# Patient Record
Sex: FEMALE | Race: WHITE | NOT HISPANIC OR LATINO
[De-identification: names, ages, dates, MRNs, and addresses within clinical notes are randomized per-mention and may not be internally consistent; named-entity substitution may affect disease eponyms.]

---

## 2019-09-17 ENCOUNTER — TRANSCRIPTION ENCOUNTER (OUTPATIENT)
Age: 64
End: 2019-09-17

## 2022-07-13 ENCOUNTER — APPOINTMENT (OUTPATIENT)
Dept: ORTHOPEDIC SURGERY | Facility: CLINIC | Age: 67
End: 2022-07-13

## 2022-07-19 PROBLEM — Z00.00 ENCOUNTER FOR PREVENTIVE HEALTH EXAMINATION: Status: ACTIVE | Noted: 2022-07-19

## 2022-07-20 ENCOUNTER — APPOINTMENT (OUTPATIENT)
Dept: ORTHOPEDIC SURGERY | Facility: AMBULATORY SURGERY CENTER | Age: 67
End: 2022-07-20

## 2022-07-27 ENCOUNTER — APPOINTMENT (OUTPATIENT)
Dept: ORTHOPEDIC SURGERY | Facility: CLINIC | Age: 67
End: 2022-07-27

## 2022-07-27 VITALS — BODY MASS INDEX: 23.95 KG/M2 | HEIGHT: 66 IN | WEIGHT: 149 LBS

## 2022-07-27 DIAGNOSIS — M92.70 JUVENILE OSTEOCHONDROSIS OF METATARSUS, UNSPECIFIED FOOT: ICD-10-CM

## 2022-07-27 DIAGNOSIS — M79.674 PAIN IN RIGHT TOE(S): ICD-10-CM

## 2022-07-27 DIAGNOSIS — M81.0 AGE-RELATED OSTEOPOROSIS W/OUT CURRENT PATHOLOGICAL FRACTURE: ICD-10-CM

## 2022-07-27 DIAGNOSIS — Z78.9 OTHER SPECIFIED HEALTH STATUS: ICD-10-CM

## 2022-07-27 PROCEDURE — 99204 OFFICE O/P NEW MOD 45 MIN: CPT | Mod: 57

## 2022-07-27 PROCEDURE — 73630 X-RAY EXAM OF FOOT: CPT | Mod: LT

## 2022-07-27 NOTE — IMAGING
[Bilateral] : foot bilaterally [Weight -] : weightbearing [Degenerative change] : Degenerative change [de-identified] : L haLLUX RIGIDUS, r HEAKLED PROX PHALANX FRACTURE, 2 MTP DJD

## 2022-07-27 NOTE — HISTORY OF PRESENT ILLNESS
[0] : 0 [Burning] : burning [Localized] : localized [Shooting] : shooting [Stabbing] : stabbing [Tightness] : tightness [Tingling] : tingling [Frequent] : frequent [Nothing helps with pain getting better] : Nothing helps with pain getting better [Retired] : Work status: retired [de-identified] : Dong foot pain and deformity.  L "bunion"  R 2nd toe injured XMas.  Was in cam boot x 4 weeks [] : no [FreeTextEntry1] : bilat feet [FreeTextEntry5] : broken toe on rt foot on 12-25-21 and saw a podiatrist 12-27-21, in a boot and xr; cannot move toe and has bruising\par bunion, poss needs surgey on left foot [FreeTextEntry6] : in toe [de-identified] : worse as day goes on [de-identified] : Dec 2021 [de-identified] : podiatrist  [de-identified] : podiatrist

## 2022-07-27 NOTE — PHYSICAL EXAM
[Left] : left foot and ankle [1st] : 1st [Right] : right foot and ankle [2nd] : 2nd [2+] : dorsalis pedis pulse: 2+ [] : Sensation present to light touch in all distributions

## 2022-07-27 NOTE — DISCUSSION/SUMMARY
[de-identified] : Discussed surgical options:  L foot  cheil/phal osteotomy  R met osteotomy/mtp/pip/edl/edb\par The risks, benefits, alternatives have been discussed.  The risks include but are not limited to infection, bleeding, injury to small nerves and blood vessels, pain, stiffness, progression, dvt, PE, amputation and death.\par \par

## 2022-08-11 ENCOUNTER — LABORATORY RESULT (OUTPATIENT)
Age: 67
End: 2022-08-11

## 2022-08-11 RX ORDER — OXYCODONE 5 MG/1
5 TABLET ORAL
Qty: 42 | Refills: 0 | Status: ACTIVE | COMMUNITY
Start: 2022-08-11 | End: 1900-01-01

## 2022-08-15 ENCOUNTER — APPOINTMENT (OUTPATIENT)
Dept: ORTHOPEDIC SURGERY | Facility: AMBULATORY SURGERY CENTER | Age: 67
End: 2022-08-15

## 2022-08-24 ENCOUNTER — APPOINTMENT (OUTPATIENT)
Dept: ORTHOPEDIC SURGERY | Facility: CLINIC | Age: 67
End: 2022-08-24

## 2022-08-24 VITALS — HEIGHT: 66 IN | BODY MASS INDEX: 23.95 KG/M2 | WEIGHT: 149 LBS

## 2022-08-24 DIAGNOSIS — Z78.9 OTHER SPECIFIED HEALTH STATUS: ICD-10-CM

## 2022-08-24 DIAGNOSIS — M20.12 HALLUX VALGUS (ACQUIRED), LEFT FOOT: ICD-10-CM

## 2022-08-24 PROCEDURE — 99024 POSTOP FOLLOW-UP VISIT: CPT

## 2022-08-24 PROCEDURE — 73630 X-RAY EXAM OF FOOT: CPT | Mod: LT

## 2022-08-24 NOTE — IMAGING
[Left] : left foot [No loss of surgical correlation. Bony alignment acceptable. Hardware in appropriate position] : No loss of surgical correlation. Bony alignment acceptable. Hardware in appropriate position [de-identified] : s/p cheilectomy. pin intact

## 2022-08-24 NOTE — DISCUSSION/SUMMARY
[de-identified] : New dressing placed with post op shoe.\par heel and outside of foot WB \par f/u 2 weeks for pin and suture removal.\par  Anticipate switching into accomodative shoes and starting therapy at that time. \par \par

## 2022-08-24 NOTE — HISTORY OF PRESENT ILLNESS
[0] : 0 [Burning] : burning [Localized] : localized [Frequent] : frequent [Nothing helps with pain getting better] : Nothing helps with pain getting better [Retired] : Work status: retired [Tightness] : tightness [Meds] : meds [Shooting] : shooting [Stabbing] : stabbing [Tingling] : tingling [de-identified] : Dong foot pain and deformity.  L "bunion"  R 2nd toe injured XMas.  Was in cam boot x 4 weeks\par \par 8/15/22: Left foot cheilectomy and proximal phalanx osteotomy\par \par 8/24/22: f/u L foot. NWB in post op shoe. \par doing well.  [] : no [FreeTextEntry1] : bilat feet [FreeTextEntry5] : broken toe on rt foot on 12-25-21 and saw a podiatrist 12-27-21, in a boot and xr; cannot move toe and has bruising\par bunion, poss needs surgey on left foot [FreeTextEntry6] : in toe [FreeTextEntry9] : elevation [de-identified] : worse as day goes on [de-identified] : Dec 2021 [de-identified] : podiatrist  [de-identified] : 8-15-22 [de-identified] : podiatrist [de-identified] : 8-15-22

## 2022-09-07 ENCOUNTER — APPOINTMENT (OUTPATIENT)
Dept: ORTHOPEDIC SURGERY | Facility: CLINIC | Age: 67
End: 2022-09-07

## 2022-09-07 VITALS — BODY MASS INDEX: 23.95 KG/M2 | WEIGHT: 149 LBS | HEIGHT: 66 IN

## 2022-09-07 PROCEDURE — 99024 POSTOP FOLLOW-UP VISIT: CPT

## 2022-09-07 NOTE — PHYSICAL EXAM
[Left] : left foot and ankle [1st] : 1st [Right] : right foot and ankle [2nd] : 2nd [2+] : dorsalis pedis pulse: 2+ [] : ambulation with crutches [FreeTextEntry3] : Pin removed

## 2022-09-07 NOTE — HISTORY OF PRESENT ILLNESS
[Gradual] : gradual [Burning] : burning [Localized] : localized [Shooting] : shooting [Stabbing] : stabbing [Tightness] : tightness [Tingling] : tingling [Frequent] : frequent [Retired] : Work status: retired [5] : 5 [de-identified] : Dong foot pain and deformity.  L "bunion"  R 2nd toe injured XMas.  Was in cam boot x 4 weeks\par \par 8/15/22: Left foot cheilectomy and proximal phalanx osteotomy\par \par 8/24/22: f/u L foot. NWB in post op shoe. \par doing well. \par 9/7/22  f/u L foot  p-op shoe [] : no [FreeTextEntry1] : bilat feet [FreeTextEntry5] : broken toe on rt foot on 12-25-21 and saw a podiatrist 12-27-21, in a boot and xr; cannot move toe and has bruising\par bunion, poss needs surgey on left foot [FreeTextEntry6] : in toe [FreeTextEntry9] : elevation [de-identified] : worse as day goes on [de-identified] : 8-15-22 [de-identified] : 8-15-22

## 2022-10-05 ENCOUNTER — APPOINTMENT (OUTPATIENT)
Dept: ORTHOPEDIC SURGERY | Facility: CLINIC | Age: 67
End: 2022-10-05

## 2022-10-19 ENCOUNTER — APPOINTMENT (OUTPATIENT)
Dept: ORTHOPEDIC SURGERY | Facility: CLINIC | Age: 67
End: 2022-10-19

## 2022-10-19 VITALS — BODY MASS INDEX: 23.95 KG/M2 | HEIGHT: 66 IN | WEIGHT: 149 LBS

## 2022-10-19 DIAGNOSIS — M20.22 HALLUX RIGIDUS, LEFT FOOT: ICD-10-CM

## 2022-10-19 PROCEDURE — 99024 POSTOP FOLLOW-UP VISIT: CPT

## 2022-10-19 NOTE — PHYSICAL EXAM
[Left] : left foot and ankle [1st] : 1st [Right] : right foot and ankle [2nd] : 2nd [2+] : dorsalis pedis pulse: 2+ [] : able to perform single heel raise [FreeTextEntry9] : 65 degrees of hallux mtp DF

## 2022-10-19 NOTE — DISCUSSION/SUMMARY
[de-identified] : Doing well. \par Continue therapy and hep.\par \par Entered by Joan HERNÁNDEZ acting as a scribe.\par Instructions: Dr. Mix- The documentation recorded by the scribe accurately reflects the service I personally performed and the decisions made by me.\par

## 2022-10-19 NOTE — HISTORY OF PRESENT ILLNESS
[Gradual] : gradual [2] : 2 [Radiating] : radiating [Tightness] : tightness [Frequent] : frequent [Physical therapy] : physical therapy [Exercising] : exercising [Retired] : Work status: retired [de-identified] : Dong foot pain and deformity.  L "bunion"  R 2nd toe injured XMas.  Was in cam boot x 4 weeks\par \par 8/15/22: Left foot cheilectomy and proximal phalanx osteotomy\par \par 8/24/22: f/u L foot. NWB in post op shoe. \par doing well. \par 9/7/22  f/u L foot  p-op shoe\par \par 10/19/22: f/u L foot. Going to therapy and making progress.  [] : no [FreeTextEntry1] : left foot today [FreeTextEntry5] : broken toe on rt foot on 12-25-21 and saw a podiatrist 12-27-21, in a boot and xr; cannot move toe and has bruising\par bunion, poss needs surgey on left foot [FreeTextEntry6] : in toe [FreeTextEntry9] : elevation [de-identified] : 8-15-22 [de-identified] : 8-15-22

## 2023-01-18 ENCOUNTER — APPOINTMENT (OUTPATIENT)
Dept: ORTHOPEDIC SURGERY | Facility: CLINIC | Age: 68
End: 2023-01-18

## 2024-04-07 ENCOUNTER — NON-APPOINTMENT (OUTPATIENT)
Age: 69
End: 2024-04-07